# Patient Record
Sex: FEMALE | Race: WHITE | URBAN - METROPOLITAN AREA
[De-identification: names, ages, dates, MRNs, and addresses within clinical notes are randomized per-mention and may not be internally consistent; named-entity substitution may affect disease eponyms.]

---

## 2018-01-16 ENCOUNTER — OFFICE VISIT (OUTPATIENT)
Dept: URGENT CARE | Facility: URGENT CARE | Age: 38
End: 2018-01-16
Payer: COMMERCIAL

## 2018-01-16 VITALS
BODY MASS INDEX: 36.28 KG/M2 | OXYGEN SATURATION: 98 % | TEMPERATURE: 98 F | WEIGHT: 224.8 LBS | DIASTOLIC BLOOD PRESSURE: 82 MMHG | SYSTOLIC BLOOD PRESSURE: 145 MMHG | HEART RATE: 90 BPM

## 2018-01-16 DIAGNOSIS — J02.0 STREP THROAT: Primary | ICD-10-CM

## 2018-01-16 DIAGNOSIS — R07.0 THROAT PAIN: ICD-10-CM

## 2018-01-16 LAB
DEPRECATED S PYO AG THROAT QL EIA: ABNORMAL
SPECIMEN SOURCE: ABNORMAL

## 2018-01-16 PROCEDURE — 87880 STREP A ASSAY W/OPTIC: CPT | Performed by: PHYSICIAN ASSISTANT

## 2018-01-16 PROCEDURE — 99203 OFFICE O/P NEW LOW 30 MIN: CPT | Performed by: PHYSICIAN ASSISTANT

## 2018-01-16 RX ORDER — PENICILLIN V POTASSIUM 500 MG/1
500 TABLET, FILM COATED ORAL 2 TIMES DAILY
Qty: 20 TABLET | Refills: 0 | Status: SHIPPED | OUTPATIENT
Start: 2018-01-16 | End: 2018-01-26

## 2018-01-16 ASSESSMENT — ENCOUNTER SYMPTOMS
RESPIRATORY NEGATIVE: 1
GASTROINTESTINAL NEGATIVE: 1
CARDIOVASCULAR NEGATIVE: 1
SORE THROAT: 1
DIAPHORESIS: 0
WEIGHT LOSS: 0
CONSTITUTIONAL NEGATIVE: 1
EYE PAIN: 0
HEMOPTYSIS: 0
FEVER: 0
COUGH: 0
PALPITATIONS: 0

## 2018-01-16 NOTE — MR AVS SNAPSHOT
"              After Visit Summary   2018    Kadie Nicole    MRN: [id number]           Patient Information     Date Of Birth          1980        Visit Information        Provider Department      2018 7:55 PM Sylwia Mccall PA-C [facility]        Today's Diagnoses     Strep throat    -  1    Throat pain           Follow-ups after your visit        Who to contact     If you have questions or need follow up information about today's clinic visit or your schedule please contact [facility] directly at [phone].  Normal or non-critical lab and imaging results will be communicated to you by onlinetourshart, letter or phone within 4 business days after the clinic has received the results. If you do not hear from us within 7 days, please contact the clinic through onlinetourshart or phone. If you have a critical or abnormal lab result, we will notify you by phone as soon as possible.  Submit refill requests through Avaz or call your pharmacy and they will forward the refill request to us. Please allow 3 business days for your refill to be completed.          Additional Information About Your Visit        MyChart Information     Avaz lets you send messages to your doctor, view your test results, renew your prescriptions, schedule appointments and more. To sign up, go to [email or web address] . Click on \"Log in\" on the left side of the screen, which will take you to the Welcome page. Then click on \"Sign up Now\" on the right side of the page.     You will be asked to enter the access code listed below, as well as some personal information. Please follow the directions to create your username and password.     Your access code is: [email or web address]  Expires: 2018  8:45 PM     Your access code will  in 90 days. If you need help or a new code, please call your [facility] or [phone].        Care EveryWhere ID     This is your Care EveryWhere ID. This could be used by other " organizations to access your [facility] medical records  [id number]        Your Vitals Were     Pulse Temperature Pulse Oximetry BMI (Body Mass Index)          90 98  F (36.7  C) (Tympanic) 98% 36.28 kg/m2         Blood Pressure from Last 3 Encounters:   01/16/18 145/82   08/12/12 117/77    Weight from Last 3 Encounters:   01/16/18 224 lb 12.8 oz (102 kg)   08/12/12 182 lb (82.6 kg)              We Performed the Following     Strep, Rapid Screen          Today's Medication Changes          These changes are accurate as of: 1/16/18  8:45 PM.  If you have any questions, ask your nurse or doctor.               Start taking these medicines.        Dose/Directions    penicillin V potassium 500 MG tablet   Commonly known as:  VEETID   Used for:  Strep throat        Dose:  500 mg   Take 1 tablet (500 mg) by mouth 2 times daily for 10 days   Quantity:  20 tablet   Refills:  0            Where to get your medicines      These medications were sent to [id number] - [place] [address] [place] AT [address] [place], [place] [facility] [place] [address]     Phone:  [phone]     penicillin V potassium 500 MG tablet                Primary Care Provider Office Phone # Fax #    [facility] [phone] [address] [address] [facility] [address]        Equal Access to Services     ROBIN TREVINO : Jay dang Sonathan, waaxda luqadaha, qaybta kaalmada dewayne, john may. So [facility] [phone].    ATENCIÓN: Si habla español, tiene a reeder disposición servicios gratuitos de asistencia lingüística. Nicolette al [phone].    We comply with applicable federal civil rights laws and [place] laws. We do not discriminate on the basis of race, color, national origin, age, disability, sex, sexual orientation, or gender identity.            Thank you!     Thank you for choosing [facility]  for your care. Our goal  is always to provide you with excellent care. Hearing back from our patients is one way we can continue to improve our services. Please take a few minutes to complete the written survey that you may receive in the mail after your visit with us. Thank you!             Your Updated Medication List - Protect others around you: Learn how to safely use, store and throw away your medicines at www.disposemymeds.org.          This list is accurate as of: 1/16/18  8:45 PM.  Always use your most recent med list.                   Brand Name Dispense Instructions for use Diagnosis    penicillin V potassium 500 MG tablet    VEETID    20 tablet    Take 1 tablet (500 mg) by mouth 2 times daily for 10 days    Strep throat       ROBITUSSIN CF PO      Take 2 tsp by mouth.        TYLENOL COLD PO      Take 2 tablets by mouth.

## 2018-01-17 NOTE — PROGRESS NOTES
SUBJECTIVE:                                                         HPI  Kadie Nicole is a 37 year old female who presents to clinic today for the following health issues:  RESPIRATORY SYMPTOMS    Duration: 2 days    Description  sore throat    Severity: mild    Accompanying signs and symptoms: mild nasal congestion and swollen glands but no PND, sinus congestion/pain/pressure. No cough, shortness of breath or wheezing.  No abdominal pain, n/v, constipation, diarrhea, bloody or black tarry stools.  No fever, chills or sweats.    History (predisposing factors):  2 of her children had strep within the past 2weeks    Precipitating or alleviating factors: None    Therapies tried and outcome:  Rest and fluids      Reviewed PMH.  Patient Active Problem List   Diagnosis     Acute pharyngitis     Current Outpatient Prescriptions   Medication Sig Dispense Refill     Cyejvijlj-VOW-AX-APAP (TYLENOL COLD PO) Take 2 tablets by mouth.       Pseudoephedrine-DM-GG (ROBITUSSIN CF PO) Take 2 tsp by mouth.       Allergies   Allergen Reactions     Sulfa Drugs        Review of Systems   Constitutional: Negative.  Negative for diaphoresis, fever and weight loss.   HENT: Positive for congestion and sore throat.    Eyes: Negative for pain.   Respiratory: Negative.  Negative for cough and hemoptysis.    Cardiovascular: Negative.  Negative for chest pain and palpitations.   Gastrointestinal: Negative.    Skin: Negative.    All other systems reviewed and are negative.      /82  Pulse 90  Temp 98  F (36.7  C) (Tympanic)  Wt 224 lb 12.8 oz (102 kg)  SpO2 98%  BMI 36.28 kg/m2  Physical Exam   Constitutional: She is oriented to person, place, and time and well-developed, well-nourished, and in no distress. No distress.   HENT:   Head: Normocephalic and atraumatic.   Nose: Nose normal.   Mouth/Throat: Uvula is midline and mucous membranes are normal. Posterior oropharyngeal erythema present. No oropharyngeal exudate, posterior  oropharyngeal edema or tonsillar abscesses.   TMs are intact without any erythema or bulging bilaterally.  Airway is patent.   Eyes: Conjunctivae and EOM are normal. Pupils are equal, round, and reactive to light. No scleral icterus.   Neck: Normal range of motion. Neck supple. No thyromegaly present.   Cardiovascular: Normal rate, regular rhythm, normal heart sounds and intact distal pulses.  Exam reveals no gallop and no friction rub.    No murmur heard.  Pulmonary/Chest: Effort normal and breath sounds normal. No respiratory distress. She has no wheezes. She has no rales.   Lymphadenopathy:        Head (right side): Tonsillar adenopathy present.        Head (left side): Tonsillar adenopathy present.     She has no cervical adenopathy.   Neurological: She is alert and oriented to person, place, and time.   Skin: Skin is warm and dry. No rash noted.   Psychiatric: Mood, memory, affect and judgment normal.   Nursing note and vitals reviewed.        Assessment/Plan:  Strep throat:  RST is positive and will treat with penicillin VK [id number].  Tylenol/ibuprofen as needed for pain/fever.  S/he is considered contagious until they have been on abxs for at least 24hours.  No sharing food, cups or utensils.  Cover coughs and wash hands.  Change toothbrush.  Have family members and close contacts be tested if symptomatic.  Rest, fluids and chicken soup.  Recheck in clinic if symptoms worsen or if symptoms do not improve.  -     Strep, Rapid Screen  -     penicillin V potassium (VEETID) 500 MG tablet; Take 1 tablet (500 mg) by mouth 2 times daily for 10 days    Throat pain:  Recommend tylenol/ibuprofen prn pain/fever, warm salt water gargles, lozenges or cough drops.          Sylwia See MIGUEL Mccall

## 2018-01-26 ENCOUNTER — OFFICE VISIT (OUTPATIENT)
Dept: URGENT CARE | Facility: URGENT CARE | Age: 38
End: 2018-01-26
Payer: COMMERCIAL

## 2018-01-26 VITALS
TEMPERATURE: 99.4 F | BODY MASS INDEX: 35.19 KG/M2 | DIASTOLIC BLOOD PRESSURE: 84 MMHG | SYSTOLIC BLOOD PRESSURE: 125 MMHG | OXYGEN SATURATION: 99 % | RESPIRATION RATE: 15 BRPM | WEIGHT: 218 LBS | HEART RATE: 96 BPM

## 2018-01-26 DIAGNOSIS — J10.1 INFLUENZA A: Primary | ICD-10-CM

## 2018-01-26 DIAGNOSIS — R52 BODY ACHES: ICD-10-CM

## 2018-01-26 LAB
FLUAV+FLUBV AG SPEC QL: NEGATIVE
FLUAV+FLUBV AG SPEC QL: POSITIVE
SPECIMEN SOURCE: ABNORMAL

## 2018-01-26 PROCEDURE — 99213 OFFICE O/P EST LOW 20 MIN: CPT | Performed by: FAMILY MEDICINE

## 2018-01-26 PROCEDURE — 87804 INFLUENZA ASSAY W/OPTIC: CPT | Performed by: FAMILY MEDICINE

## 2018-01-26 RX ORDER — OSELTAMIVIR PHOSPHATE 75 MG/1
75 CAPSULE ORAL 2 TIMES DAILY
Qty: 10 CAPSULE | Refills: 0 | Status: SHIPPED | OUTPATIENT
Start: 2018-01-26

## 2018-01-26 NOTE — MR AVS SNAPSHOT
After Visit Summary   1/26/2018    Kadie Nicole    MRN: [id number]           Patient Information     Date Of Birth          1980        Visit Information        Provider Department      1/26/2018 7:40 PM Shine Mujica MD [facility]        Today's Diagnoses     Influenza A    -  1    Body aches          Care Instructions      Influenza (Adult)    Influenza is also called the flu. It is a viral illness that affects the air passages of your lungs. It is different from the common cold. The flu can easily be passed from one to person to another. It may be spread through the air by coughing and sneezing. Or it can be spread by touching the sick person and then touching your own eyes, nose, or mouth.  The flu starts 1 to 3 days after you are exposed to the flu virus. It may last for 1 to 2 weeks but many people feel tired or fatigued for many weeks afterward. You usually don t need to take antibiotics unless you have a complication. This might be an ear or sinus infection or pneumonia.  Symptoms of the flu may be mild or severe. They can include extreme tiredness (wanting to stay in bed all day), chills, fevers, muscle aches, soreness with eye movement, headache, and a dry, hacking cough.  Home care  Follow these guidelines when caring for yourself at home:    Avoid being around cigarette smoke, whether yours or other people s.    Acetaminophen or ibuprofen will help ease your fever, muscle aches, and headache. Don t give aspirin to anyone younger than 18 who has the flu. Aspirin can harm the liver.    Nausea and loss of appetite are common with the flu. Eat light meals. Drink 6 to 8 glasses of liquids every day. Good choices are water, sport drinks, soft drinks without caffeine, juices, tea, and soup. Extra fluids will also help loosen secretions in your nose and lungs.    Over-the-counter cold medicines will not make the flu go away faster. But the medicines may help with coughing,  sore throat, and congestion in your nose and sinuses. Don t use a decongestant if you have high blood pressure.    Stay home until your fever has been gone for at least 24 hours without using medicine to reduce fever.  Follow-up care  Follow up with your healthcare provider, or as advised, if you are not getting better over the next week.  If you are age 65 or older, talk with your provider about getting a pneumococcal vaccine every 5 years. You should also get this vaccine if you have chronic asthma or COPD. All adults should get a flu vaccine every fall. Ask your provider about this.  When to seek medical advice  Call your healthcare provider right away if any of these occur:    Cough with lots of colored mucus (sputum) or blood in your mucus    Chest pain, shortness of breath, wheezing, or trouble breathing    Severe headache, or face, neck, or ear pain    New rash with fever    Fever of 100.4 F (38 C) or higher, or as directed by your healthcare provider    Confusion, behavior change, or seizure    Severe weakness or dizziness    You get a new fever or cough after getting better for a few days  Date Last Reviewed: 1/1/2017 2000-2017 [employer]. [address] [place] [address]. All rights reserved. This information is not intended as a substitute for professional medical care. Always follow your healthcare professional's instructions.        Oseltamivir capsules  Brand Name: Tamiflu  What is this medicine?  OSELTAMIVIR (os el FANG i vir) is an antiviral medicine. It is used to prevent and to treat some kinds of influenza or the flu. It will not work for colds or other viral infections.  How should I use this medicine?  Take this medicine by mouth with a glass of water. Follow the directions on the prescription label. Start this medicine at the first sign of flu symptoms. You can take it with or without food. If it upsets your stomach, take it with food. Take your medicine at regular  intervals. Do not take your medicine more often than directed. Take all of your medicine as directed even if you think you are better. Do not skip doses or stop your medicine early.  Talk to your pediatrician regarding the use of this medicine in children. While this drug may be prescribed for children as young as 14 days for selected conditions, precautions do apply.  What side effects may I notice from receiving this medicine?  Side effects that you should report to your doctor or health care professional as soon as possible:    allergic reactions like skin rash, itching or hives, swelling of the face, lips, or tongue    anxiety, confusion, unusual behavior    breathing problems    hallucination, loss of contact with reality    redness, blistering, peeling or loosening of the skin, including inside the mouth    seizures  Side effects that usually do not require medical attention (report to your doctor or health care professional if they continue or are bothersome):    diarrhea    headache    nausea, vomiting    pain  What may interact with this medicine?  Interactions are not expected.  What if I miss a dose?  If you miss a dose, take it as soon as you remember. If it is almost time for your next dose (within 2 hours), take only that dose. Do not take double or extra doses.  Where should I keep my medicine?  Keep out of the reach of children.  Store at room temperature between 15 and 30 degrees C (59 and 86 degrees F). Throw away any unused medicine after the expiration date.  What should I tell my health care provider before I take this medicine?  They need to know if you have any of the following conditions:    heart disease    immune system problems    kidney disease    liver disease    lung disease    an unusual or allergic reaction to oseltamivir, other medicines, foods, dyes, or preservatives    pregnant or trying to get pregnant    breast-feeding  What should I watch for while using this medicine?  Visit  "your doctor or health care professional for regular check ups. Tell your doctor if your symptoms do not start to get better or if they get worse.  If you have the flu, you may be at an increased risk of developing seizures, confusion, or abnormal behavior. This occurs early in the illness, and more frequently in children and teens. These events are not common, but may result in accidental injury to the patient. Families and caregivers of patients should watch for signs of unusual behavior and contact a doctor or health care professional right away if the patient shows signs of unusual behavior.  This medicine is not a substitute for the flu shot. Talk to your doctor each year about an annual flu shot.  NOTE:This sheet is a summary. It may not cover all possible information. If you have questions about this medicine, talk to your doctor, pharmacist, or health care provider. Copyright  2017 [employer]                Follow-ups after your visit        Who to contact     If you have questions or need follow up information about today's clinic visit or your schedule please contact [facility] at [phone].  Normal or non-critical lab and imaging results will be communicated to you by TowerJazzhart, letter or phone within 4 business days after the clinic has received the results. If you do not hear from us within 7 days, please contact the clinic through TowerJazzhart or phone. If you have a critical or abnormal lab result, we will notify you by phone as soon as possible.  Submit refill requests through [employer] or call your pharmacy and they will forward the refill request to us. Please allow 3 business days for your refill to be completed.          Additional Information About Your Visit        [employer] Information     [employer] lets you send messages to your doctor, view your test results, renew your prescriptions, schedule appointments and more. To sign up, go to [email or web address] . Click on \"Log in\" on " "the left side of the screen, which will take you to the Welcome page. Then click on \"Sign up Now\" on the right side of the page.     You will be asked to enter the access code listed below, as well as some personal information. Please follow the directions to create your username and password.     Your access code is: [email or web address]  Expires: 2018  8:45 PM     Your access code will  in 90 days. If you need help or a new code, please call your [facility] or [phone].        Care EveryWhere ID     This is your Care EveryWhere ID. This could be used by other organizations to access your [facility] medical records  [id number]        Your Vitals Were     Pulse Temperature Respirations Pulse Oximetry BMI (Body Mass Index)       96 99.4  F (37.4  C) 15 99% 35.19 kg/m2        Blood Pressure from Last 3 Encounters:   18 125/84   18 145/82   12 117/77    Weight from Last 3 Encounters:   18 218 lb (98.9 kg)   18 224 lb 12.8 oz (102 kg)   12 182 lb (82.6 kg)              We Performed the Following     Influenza A/B antigen          Today's Medication Changes          These changes are accurate as of 18  8:24 PM.  If you have any questions, ask your nurse or doctor.               Start taking these medicines.        Dose/Directions    oseltamivir 75 MG capsule   Commonly known as:  TAMIFLU   Used for:  Influenza A   Started by:  Shine Mujica MD        Dose:  75 mg   Take 1 capsule (75 mg) by mouth 2 times daily   Quantity:  10 capsule   Refills:  0            Where to get your medicines      Some of these will need a paper prescription and others can be bought over the counter.  Ask your nurse if you have questions.     Bring a paper prescription for each of these medications     oseltamivir 75 MG capsule                Primary Care Provider Office Phone # Fax #    [facility] [phone] [address] [address] [facility] [address]      "   Equal Access to Services     [facility] Center[facility]EN : Hadii aad ku hadraymonalan Rock, waelisada luedithjannaha, qakwesi shakimjohn hart. So [facility] [phone].    ATENCIÓN: Si habla español, tiene a reeder disposición servicios gratuitos de asistencia lingüística. Llame al [phone].    We comply with applicable federal civil rights laws and [place] laws. We do not discriminate on the basis of race, color, national origin, age, disability, sex, sexual orientation, or gender identity.            Thank you!     Thank you for choosing [facility]  [facility] your [facility]. Our goal is always to provide you with excellent care. Hearing back from our patients is one way we can continue to improve our services. Please take a few minutes to complete the written survey that you may receive in the mail after your visit with us. Thank you!             Your Updated Medication List - Protect others around you: Learn how to safely use, store and throw away your medicines at www.disposemymeds.org.          This list is accurate as of 1/26/18  8:24 PM.  Always use your most recent med list.                   Brand Name Dispense Instructions for use Diagnosis    oseltamivir 75 MG capsule    TAMIFLU    10 capsule    Take 1 capsule (75 mg) by mouth 2 times daily    Influenza A       penicillin V potassium 500 MG tablet    VEETID    20 tablet    Take 1 tablet (500 mg) by mouth 2 times daily for 10 days    Strep throat       ROBITUSSIN CF PO      Take 2 tsp by mouth.        TYLENOL COLD PO      Take 2 tablets by mouth.

## 2018-01-27 NOTE — PATIENT INSTRUCTIONS
Influenza (Adult)    Influenza is also called the flu. It is a viral illness that affects the air passages of your lungs. It is different from the common cold. The flu can easily be passed from one to person to another. It may be spread through the air by coughing and sneezing. Or it can be spread by touching the sick person and then touching your own eyes, nose, or mouth.  The flu starts 1 to 3 days after you are exposed to the flu virus. It may last for 1 to 2 weeks but many people feel tired or fatigued for many weeks afterward. You usually don t need to take antibiotics unless you have a complication. This might be an ear or sinus infection or pneumonia.  Symptoms of the flu may be mild or severe. They can include extreme tiredness (wanting to stay in bed all day), chills, fevers, muscle aches, soreness with eye movement, headache, and a dry, hacking cough.  Home care  Follow these guidelines when caring for yourself at home:    Avoid being around cigarette smoke, whether yours or other people s.    Acetaminophen or ibuprofen will help ease your fever, muscle aches, and headache. Don t give aspirin to anyone younger than 18 who has the flu. Aspirin can harm the liver.    Nausea and loss of appetite are common with the flu. Eat light meals. Drink 6 to 8 glasses of liquids every day. Good choices are water, sport drinks, soft drinks without caffeine, juices, tea, and soup. Extra fluids will also help loosen secretions in your nose and lungs.    Over-the-counter cold medicines will not make the flu go away faster. But the medicines may help with coughing, sore throat, and congestion in your nose and sinuses. Don t use a decongestant if you have high blood pressure.    Stay home until your fever has been gone for at least 24 hours without using medicine to reduce fever.  Follow-up care  Follow up with your healthcare provider, or as advised, if you are not getting better over the next week.  If you are age 65 or  older, talk with your provider about getting a pneumococcal vaccine every 5 years. You should also get this vaccine if you have chronic asthma or COPD. All adults should get a flu vaccine every fall. Ask your provider about this.  When to seek medical advice  Call your healthcare provider right away if any of these occur:    Cough with lots of colored mucus (sputum) or blood in your mucus    Chest pain, shortness of breath, wheezing, or trouble breathing    Severe headache, or face, neck, or ear pain    New rash with fever    Fever of 100.4 F (38 C) or higher, or as directed by your healthcare provider    Confusion, behavior change, or seizure    Severe weakness or dizziness    You get a new fever or cough after getting better for a few days  Date Last Reviewed: 1/1/2017 2000-2017 [employer]. [address] [place] [address], [place] [address]. All rights reserved. This information is not intended as a substitute for professional medical care. Always follow your healthcare professional's instructions.        Oseltamivir capsules  Brand Name: Tamiflu  What is this medicine?  OSELTAMIVIR (os el FANG i vir) is an antiviral medicine. It is used to prevent and to treat some kinds of influenza or the flu. It will not work for colds or other viral infections.  How should I use this medicine?  Take this medicine by mouth with a glass of water. Follow the directions on the prescription label. Start this medicine at the first sign of flu symptoms. You can take it with or without food. If it upsets your stomach, take it with food. Take your medicine at regular intervals. Do not take your medicine more often than directed. Take all of your medicine as directed even if you think you are better. Do not skip doses or stop your medicine early.  Talk to your pediatrician regarding the use of this medicine in children. While this drug may be prescribed for children as young as 14 days for selected conditions, precautions do  apply.  What side effects may I notice from receiving this medicine?  Side effects that you should report to your doctor or health care professional as soon as possible:    allergic reactions like skin rash, itching or hives, swelling of the face, lips, or tongue    anxiety, confusion, unusual behavior    breathing problems    hallucination, loss of contact with reality    redness, blistering, peeling or loosening of the skin, including inside the mouth    seizures  Side effects that usually do not require medical attention (report to your doctor or health care professional if they continue or are bothersome):    diarrhea    headache    nausea, vomiting    pain  What may interact with this medicine?  Interactions are not expected.  What if I miss a dose?  If you miss a dose, take it as soon as you remember. If it is almost time for your next dose (within 2 hours), take only that dose. Do not take double or extra doses.  Where should I keep my medicine?  Keep out of the reach of children.  Store at room temperature between 15 and 30 degrees C (59 and 86 degrees F). Throw away any unused medicine after the expiration date.  What should I tell my health care provider before I take this medicine?  They need to know if you have any of the following conditions:    heart disease    immune system problems    kidney disease    liver disease    lung disease    an unusual or allergic reaction to oseltamivir, other medicines, foods, dyes, or preservatives    pregnant or trying to get pregnant    breast-feeding  What should I watch for while using this medicine?  Visit your doctor or health care professional for regular check ups. Tell your doctor if your symptoms do not start to get better or if they get worse.  If you have the flu, you may be at an increased risk of developing seizures, confusion, or abnormal behavior. This occurs early in the illness, and more frequently in children and teens. These events are not common, but  may result in accidental injury to the patient. Families and caregivers of patients should watch for signs of unusual behavior and contact a doctor or health care professional right away if the patient shows signs of unusual behavior.  This medicine is not a substitute for the flu shot. Talk to your doctor each year about an annual flu shot.  NOTE:This sheet is a summary. It may not cover all possible information. If you have questions about this medicine, talk to your doctor, pharmacist, or health care provider. Copyright  2017 [employer]

## 2018-01-27 NOTE — PROGRESS NOTES
SUBJECTIVE:                                                    Kadie Nicole is a 37 year old female who presents to clinic today for the following health issues:      RESPIRATORY SYMPTOMS      Duration: 1 day    Description  fever and body achy    Severity: moderate    Accompanying signs and symptoms: None    History (predisposing factors):  strep exposure    Precipitating or alleviating factors: None    Therapies tried and outcome:  penicillin        Problem list and histories reviewed & adjusted, as indicated.  Additional history: as documented    Patient Active Problem List   Diagnosis     Acute pharyngitis     No past surgical history on file.    Social History   Substance Use Topics     Smoking status: Never Smoker     Smokeless tobacco: Never Used     Alcohol use Not on file     No family history on file.      Current Outpatient Prescriptions   Medication Sig Dispense Refill     penicillin V potassium (VEETID) 500 MG tablet Take 1 tablet (500 mg) by mouth 2 times daily for 10 days 20 tablet 0     Immpgtffz-GGJ-BT-APAP (TYLENOL COLD PO) Take 2 tablets by mouth.       Pseudoephedrine-DM-GG (ROBITUSSIN CF PO) Take 2 tsp by mouth.       Allergies   Allergen Reactions     Sulfa Drugs      No lab results found.   BP Readings from Last 3 Encounters:   01/26/18 125/84   01/16/18 145/82   08/12/12 117/77    Wt Readings from Last 3 Encounters:   01/26/18 218 lb (98.9 kg)   01/16/18 224 lb 12.8 oz (102 kg)   08/12/12 182 lb (82.6 kg)                  Labs reviewed in EPIC    ROS:  Constitutional, HEENT, cardiovascular, pulmonary, gi and gu systems are negative, except as otherwise noted.    OBJECTIVE:     /84  Pulse 96  Temp 99.4  F (37.4  C)  Resp 15  Wt 218 lb (98.9 kg)  SpO2 99%  BMI 35.19 kg/m2  Body mass index is 35.19 kg/(m^2).  GENERAL: healthy, alert and no distress  EYES: Eyes grossly normal to inspection, PERRL and conjunctivae and sclerae normal  HENT: normal cephalic/atraumatic, ear canals  and TM's normal, rhinorrhea clear, oral mucous membranes moist, oropharxnx crowded and sinuses: not tender  NECK: no adenopathy, no asymmetry, masses, or scars and thyroid normal to palpation  RESP: lungs clear to auscultation - no rales, rhonchi or wheezes  CV: regular rate and rhythm, normal S1 S2, no S3 or S4, no murmur, click or rub, no peripheral edema and peripheral pulses strong  ABDOMEN: soft, nontender, no hepatosplenomegaly, no masses and bowel sounds normal  MS: no gross musculoskeletal defects noted, no edema      Diagnostic Test Results:  Results for orders placed or performed in visit on 01/26/18 (from the past 24 hour(s))   Influenza A/B antigen   Result Value Ref Range    Influenza A/B Agn Specimen Nasal     Influenza A Positive (A) NEG^Negative    Influenza B Negative NEG^Negative       ASSESSMENT/PLAN:         ICD-10-CM    1. Influenza A J10.1 oseltamivir (TAMIFLU) 75 MG capsule   2. Body aches R52 Influenza A/B antigen       Discussed in detail differentials and further management. Symptoms are secondary to influenza A. Tamiflu prescribed as per patient desire, risks and benefits explained. Recommended well hydration, over-the-counter analgesia, warm fluids and rest. Written instructions/information provided. Patient understood and in agreement with the above plan. All questions are answered. Follow-up if symptoms persist or worsen.        Patient Instructions     Influenza (Adult)    Influenza is also called the flu. It is a viral illness that affects the air passages of your lungs. It is different from the common cold. The flu can easily be passed from one to person to another. It may be spread through the air by coughing and sneezing. Or it can be spread by touching the sick person and then touching your own eyes, nose, or mouth.  The flu starts 1 to 3 days after you are exposed to the flu virus. It may last for 1 to 2 weeks but many people feel tired or fatigued for many weeks afterward. You  usually don t need to take antibiotics unless you have a complication. This might be an ear or sinus infection or pneumonia.  Symptoms of the flu may be mild or severe. They can include extreme tiredness (wanting to stay in bed all day), chills, fevers, muscle aches, soreness with eye movement, headache, and a dry, hacking cough.  Home care  Follow these guidelines when caring for yourself at home:    Avoid being around cigarette smoke, whether yours or other people s.    Acetaminophen or ibuprofen will help ease your fever, muscle aches, and headache. Don t give aspirin to anyone younger than 18 who has the flu. Aspirin can harm the liver.    Nausea and loss of appetite are common with the flu. Eat light meals. Drink 6 to 8 glasses of liquids every day. Good choices are water, sport drinks, soft drinks without caffeine, juices, tea, and soup. Extra fluids will also help loosen secretions in your nose and lungs.    Over-the-counter cold medicines will not make the flu go away faster. But the medicines may help with coughing, sore throat, and congestion in your nose and sinuses. Don t use a decongestant if you have high blood pressure.    Stay home until your fever has been gone for at least 24 hours without using medicine to reduce fever.  Follow-up care  Follow up with your healthcare provider, or as advised, if you are not getting better over the next week.  If you are age 65 or older, talk with your provider about getting a pneumococcal vaccine every 5 years. You should also get this vaccine if you have chronic asthma or COPD. All adults should get a flu vaccine every fall. Ask your provider about this.  When to seek medical advice  Call your healthcare provider right away if any of these occur:    Cough with lots of colored mucus (sputum) or blood in your mucus    Chest pain, shortness of breath, wheezing, or trouble breathing    Severe headache, or face, neck, or ear pain    New rash with fever    Fever of  100.4 F (38 C) or higher, or as directed by your healthcare provider    Confusion, behavior change, or seizure    Severe weakness or dizziness    You get a new fever or cough after getting better for a few days  Date Last Reviewed: 1/1/2017 2000-2017 [employer]. [address] [place] [id number]. All rights reserved. This information is not intended as a substitute for professional medical care. Always follow your healthcare professional's instructions.        Oseltamivir capsules  Brand Name: Tamiflu  What is this medicine?  OSELTAMIVIR (os el FANG i vir) is an antiviral medicine. It is used to prevent and to treat some kinds of influenza or the flu. It will not work for colds or other viral infections.  How should I use this medicine?  Take this medicine by mouth with a glass of water. Follow the directions on the prescription label. Start this medicine at the first sign of flu symptoms. You can take it with or without food. If it upsets your stomach, take it with food. Take your medicine at regular intervals. Do not take your medicine more often than directed. Take all of your medicine as directed even if you think you are better. Do not skip doses or stop your medicine early.  Talk to your pediatrician regarding the use of this medicine in children. While this drug may be prescribed for children as young as 14 days for selected conditions, precautions do apply.  What side effects may I notice from receiving this medicine?  Side effects that you should report to your doctor or health care professional as soon as possible:    allergic reactions like skin rash, itching or hives, swelling of the face, lips, or tongue    anxiety, confusion, unusual behavior    breathing problems    hallucination, loss of contact with reality    redness, blistering, peeling or loosening of the skin, including inside the mouth    seizures  Side effects that usually do not require medical attention (report to  your doctor or health care professional if they continue or are bothersome):    diarrhea    headache    nausea, vomiting    pain  What may interact with this medicine?  Interactions are not expected.  What if I miss a dose?  If you miss a dose, take it as soon as you remember. If it is almost time for your next dose (within 2 hours), take only that dose. Do not take double or extra doses.  Where should I keep my medicine?  Keep out of the reach of children.  Store at room temperature between 15 and 30 degrees C (59 and 86 degrees F). Throw away any unused medicine after the expiration date.  What should I tell my health care provider before I take this medicine?  They need to know if you have any of the following conditions:    heart disease    immune system problems    kidney disease    liver disease    lung disease    an unusual or allergic reaction to oseltamivir, other medicines, foods, dyes, or preservatives    pregnant or trying to get pregnant    breast-feeding  What should I watch for while using this medicine?  Visit your doctor or health care professional for regular check ups. Tell your doctor if your symptoms do not start to get better or if they get worse.  If you have the flu, you may be at an increased risk of developing seizures, confusion, or abnormal behavior. This occurs early in the illness, and more frequently in children and teens. These events are not common, but may result in accidental injury to the patient. Families and caregivers of patients should watch for signs of unusual behavior and contact a doctor or health care professional right away if the patient shows signs of unusual behavior.  This medicine is not a substitute for the flu shot. Talk to your doctor each year about an annual flu shot.  NOTE:This sheet is a summary. It may not cover all possible information. If you have questions about this medicine, talk to your doctor, pharmacist, or health care provider. Copyright  2017  Los Mujica MD  [facility]